# Patient Record
Sex: MALE | Race: WHITE | NOT HISPANIC OR LATINO | URBAN - METROPOLITAN AREA
[De-identification: names, ages, dates, MRNs, and addresses within clinical notes are randomized per-mention and may not be internally consistent; named-entity substitution may affect disease eponyms.]

---

## 2021-09-21 ENCOUNTER — EMERGENCY (EMERGENCY)
Facility: HOSPITAL | Age: 21
LOS: 1 days | Discharge: HOME | End: 2021-09-21
Admitting: EMERGENCY MEDICINE
Payer: COMMERCIAL

## 2021-09-21 VITALS
OXYGEN SATURATION: 97 % | TEMPERATURE: 99 F | WEIGHT: 138.89 LBS | RESPIRATION RATE: 16 BRPM | HEIGHT: 73.23 IN | SYSTOLIC BLOOD PRESSURE: 130 MMHG | DIASTOLIC BLOOD PRESSURE: 97 MMHG | HEART RATE: 91 BPM
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
## 2021-09-21 DIAGNOSIS — S61.201A UNSPECIFIED OPEN WOUND OF LEFT INDEX FINGER WITHOUT DAMAGE TO NAIL, INITIAL ENCOUNTER: ICD-10-CM
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
## 2021-09-21 DIAGNOSIS — Z86.79 PERSONAL HISTORY OF OTHER DISEASES OF THE CIRCULATORY SYSTEM: ICD-10-CM

## 2021-09-21 PROCEDURE — 99282 EMERGENCY DEPT VISIT SF MDM: CPT

## 2021-09-21 NOTE — ED PROVIDER NOTE - PHYSICAL EXAMINATION
CONSTITUTIONAL: Well-appearing; well-nourished; in no apparent distress.   	HEAD: Normocephalic; atraumatic.   	LUE: distal 2nd fingertip superficial skin avulsion, no active bleeding or fb, no visible bone/tendon or nerve. no sensory or motor deficits. tendon function intact. good cap refill. rest of hand nontender.   PSYCHOLOGICAL: The patient’s mood and manner are appropriate.

## 2021-09-21 NOTE — ED PROVIDER NOTE - PATIENT PORTAL LINK FT
You can access the FollowMyHealth Patient Portal offered by [facility] by registering at the following website: [email or web address]. By joining [employer]’s FollowMyHealth portal, you will also be able to view your health information using other applications (apps) compatible with our system.

## 2021-09-21 NOTE — ED PROVIDER NOTE - OBJECTIVE STATEMENT
22 yo male with RHD presents c/o left 2nd digit distal fingertip skin avulsion after using new [employer] knife at [employer] for art project. occurred about an hour, oozing of blood resolved with direct pressure. tetanus utd. no other injuries.

## 2021-09-21 NOTE — ED PROVIDER NOTE - NSFOLLOWUPINSTRUCTIONS_ED_ALL_ED_FT
Keep wound clean and dry    Return for any concerning or worsening symptoms such as persistent bleeding, pus, redness, swelling or any concerns.     Follow up with hand specialist as needed.

## 2021-09-21 NOTE — ED PROVIDER NOTE - CLINICAL SUMMARY MEDICAL DECISION MAKING FREE TEXT BOX
left 2nd distal fingertip superficial skin avulsion, no active bleeding, no fb. wound irrigated with water, tetanus updated. skin glue applied, wound care discussed, will melvin.

## 2021-09-21 NOTE — ED PROVIDER NOTE - CARE PROVIDER_API CALL
Wilfrido Cantrell)  Plastic Surgery  [address], [address]  [place] [address]  Phone: [phone]  Fax: [phone]  Follow Up Time:

## 2022-09-22 ENCOUNTER — EMERGENCY (EMERGENCY)
Facility: HOSPITAL | Age: 22
LOS: 1 days | Discharge: HOME | End: 2022-09-22
Admitting: STUDENT IN AN ORGANIZED HEALTH CARE EDUCATION/TRAINING PROGRAM

## 2022-09-22 VITALS
DIASTOLIC BLOOD PRESSURE: 74 MMHG | HEART RATE: 51 BPM | TEMPERATURE: 99 F | SYSTOLIC BLOOD PRESSURE: 119 MMHG | OXYGEN SATURATION: 100 % | RESPIRATION RATE: 18 BRPM

## 2022-09-22 VITALS
SYSTOLIC BLOOD PRESSURE: 112 MMHG | RESPIRATION RATE: 16 BRPM | DIASTOLIC BLOOD PRESSURE: 64 MMHG | WEIGHT: 163.14 LBS | HEART RATE: 58 BPM | TEMPERATURE: 98 F | OXYGEN SATURATION: 94 % | HEIGHT: 73.23 IN
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
## 2022-09-22 DIAGNOSIS — S61.412A LACERATION WITHOUT FOREIGN BODY OF LEFT HAND, INITIAL ENCOUNTER: ICD-10-CM
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
## 2022-09-22 PROCEDURE — 73130 X-RAY EXAM OF HAND: CPT | Mod: 26,LT

## 2022-09-22 PROCEDURE — 99283 EMERGENCY DEPT VISIT LOW MDM: CPT | Mod: 25

## 2022-09-22 PROCEDURE — 12002 RPR S/N/AX/GEN/TRNK2.6-7.5CM: CPT

## 2022-09-22 NOTE — ED PROVIDER NOTE - OBJECTIVE STATEMENT
21 y/o M with no PMH presents to the ED for a laceration to the L hand [Pt is R hand dominant]. Pt reports he was cutting a baguette when he accidentally lacerated his hand. Pt denies numbness, tingling, weakness, or any additional injuries. Tetanus is up to date.

## 2022-09-22 NOTE — ED PROVIDER NOTE - NSFOLLOWUPINSTRUCTIONS_ED_ALL_ED_FT
Laceration    A laceration is a cut that goes through all of the layers of the skin and into the tissue that is right under the skin. Some lacerations heal on their own. Others need to be closed with skin adhesive strips, skin glue, stitches (sutures), or staples. Proper laceration care minimizes the risk of infection and helps the laceration to heal better.  If non-absorbable stitches or staples have been placed, they must be taken out within the time frame instructed by your healthcare provider.    SEEK IMMEDIATE MEDICAL CARE IF YOU HAVE ANY OF THE FOLLOWING SYMPTOMS: swelling around the wound, worsening pain, drainage from the wound, red streaking going away from your wound, inability to move finger or toe near the laceration, or discoloration of skin near the laceration.     KEEP WOUND CLEAN AND DRY FOR 24 HOURS. AFTER THAT OKAY TO WASH NORMALLY. DO NOT SUBMERGE UNTIL SUTURES ARE REMOVED (IE POOL OR DISHES) AS THIS INCREASES RISK OF INFECTION.     CHANGE DRESSING DAILY AND APPLY ANTIBIOTIC OINTMENT IE BACITRACIN WITH EACH DRESSING CHANGE.     AFTER 5 DAYS LEAVE OPEN TO AIR AT NIGHT TO PREVENT WOUND FROM BECOMING TOO MOIST.     RETURN FOR SUTURE REMOVAL IN 10-14 DAYS.

## 2022-09-22 NOTE — ED ADULT NURSE NOTE - OBJECTIVE STATEMENT
Pt is a 22y male complaining of laceration to left palm while cutting bread. + bleeding. Unknown last tetanus.

## 2022-09-22 NOTE — ED PROVIDER NOTE - MUSCULOSKELETAL, MLM
L hand: 3.5cm laceration to volar aspect of 2nd MCP. Subcutaneous fat visible. No pain with ROM. Normal strength flexion/extension, abduction/adduction, and normal sensations to each digit. 2+ radial pulses. Cap refill less than 2 seconds

## 2022-09-22 NOTE — ED PROVIDER NOTE - CLINICAL SUMMARY MEDICAL DECISION MAKING FREE TEXT BOX
23 y/o M presenting with a L hand laceration. Plan for XR imaging. Will repair laceration and discharge afterwards.

## 2022-09-22 NOTE — ED PROVIDER NOTE - PATIENT PORTAL LINK FT
You can access the FollowMyHealth Patient Portal offered by [facility] by registering at the following website: [email or web address]. By joining "[employer]’s FollowMyHealth portal, you will also be able to view your health information using other applications (apps) compatible with our system.

## 2024-04-11 NOTE — ED ADULT TRIAGE NOTE - BSA (M2)
Labs: weekly    Follow up with Dr. Hinton as scheduled    Medication changes:  - tums 2 tablets three times a day       1.98